# Patient Record
(demographics unavailable — no encounter records)

---

## 2024-12-19 NOTE — PLAN
[FreeTextEntry1] : Discussed importance of monthly self examination of the testicles. Instructed patient on proper way to examine himself.

## 2024-12-19 NOTE — HEALTH RISK ASSESSMENT
[Good] : ~his/her~  mood as  good [No] : No [1 or 2 (0 pts)] : 1 or 2 (0 points) [Never (0 pts)] : Never (0 points) [No falls in past year] : Patient reported no falls in the past year [PHQ-2 Negative - No further assessment needed] : PHQ-2 Negative - No further assessment needed [Never] : Never [With Significant Other] : lives with significant other [Employed] : employed [Feels Safe at Home] : Feels safe at home [Fully functional (bathing, dressing, toileting, transferring, walking, feeding)] : Fully functional (bathing, dressing, toileting, transferring, walking, feeding) [Fully functional (using the telephone, shopping, preparing meals, housekeeping, doing laundry, using] : Fully functional and needs no help or supervision to perform IADLs (using the telephone, shopping, preparing meals, housekeeping, doing laundry, using transportation, managing medications and managing finances) [Reports normal functional visual acuity (ie: able to read med bottle)] : Reports normal functional visual acuity [0] : 1) Little interest or pleasure doing things: Not at all (0) [HIV test declined] : HIV test declined [Audit-CScore] : 0 [de-identified] : none [de-identified] : eats everything [[id number]] : 0 [Hepatitis C test declined] : Hepatitis C test declined [Reports changes in hearing] : Reports no changes in hearing [Reports changes in vision] : Reports no changes in vision [Reports changes in dental health] : Reports no changes in dental health [ColonoscopyComments] : never [HepatitisCDate] : 02/23 [HepatitisCComments] : negative [FreeTextEntry2] : Afsaneh

## 2024-12-19 NOTE — REVIEW OF SYSTEMS
Problem: Fall Injury Risk  Goal: Absence of Fall and Fall-Related Injury  Outcome: Ongoing, Progressing     Problem: Skin Injury Risk Increased  Goal: Skin Health and Integrity  Outcome: Ongoing, Progressing       Patient remains oriented x4.  Patient resting calm at this time.  BSS diminished throughout, no acute distress noted.  Pain controlled with PRN IV pain medication.  No complaints of nausea.  Safety precautions in place and maintained,  Bed alarm activated.  Will continue to monitor.   [Recent Change In Weight] : ~T recent weight change [Negative] : Psychiatric [Vision Problems] : no vision problems [Constipation] : no constipation [FreeTextEntry7] : LLQ abdominal pain x 3

## 2024-12-19 NOTE — HISTORY OF PRESENT ILLNESS
[FreeTextEntry1] : physical exam. [de-identified] : Mr. CRALOS REARDON is a 41 year old male here today to establish care Found not to have epilepsy Eye: 2023 TdaP: 2022 Flu: Refused   Yes

## 2024-12-19 NOTE — PHYSICAL EXAM
[No Acute Distress] : no acute distress [Well Nourished] : well nourished [Well Developed] : well developed [Normal Oropharynx] : the oropharynx was normal [Normal TMs] : both tympanic membranes were normal [No Lymphadenopathy] : no lymphadenopathy [Thyroid Normal, No Nodules] : the thyroid was normal and there were no nodules present [Clear to Auscultation] : lungs were clear to auscultation bilaterally [No Edema] : there was no peripheral edema [Soft] : abdomen soft [Non Tender] : non-tender [No Masses] : no abdominal mass palpated [No HSM] : no HSM [No Hernias] : no hernias [Normal] : affect was normal and insight and judgment were intact

## 2025-02-03 NOTE — PLAN
[FreeTextEntry1] : This a 41-year-old male  to one of the ambulatory surgery nurses who has no medical or surgical history with exception of minor procedure done on his hand presents with a new lump in the anterior portion of his neck right in front of his trachea.  It does not bother him.  It is only been there for about a month.  There has been no drainage.  His wife thinks it may be an ingrown hair.  Limited review of systems: Patient denies fevers, night sweats, or weight loss.  He has no history of cancer.  He has no history of abnormal appearing skin lesions.  He has no known drug allergies  He takes no medications    On limited exam he has a 1 cm irregular lesion subcutaneous not attached to the underlying fascia.  It is difficult to tell if there is an overlying puncta as he has some recently shaved neck hair in that area.  It is unclear if this is a small lipoma or a cyst clinically.  Plan: Patient has a's mobile soft tissue mass of his anterior neck.  It is unclear if this is a epidermal inclusion cyst or a lipoma.  Very low in the differential would be a metastatic implant.  Will plan for excision in the minor op procedure room under straight local anesthesia.  Patient prefers to do this on this coming Thursday.  Risk of bleeding, infection, numbing, and scar have been explained to the patient and he is agreeable to the procedure.  30 minutes of time was spent taking the patient's history, examining the patient, discussing options, discussing risk and benefits of the procedure, scheduling the procedure, and charting.